# Patient Record
Sex: MALE | Race: WHITE | NOT HISPANIC OR LATINO | Employment: FULL TIME | URBAN - METROPOLITAN AREA
[De-identification: names, ages, dates, MRNs, and addresses within clinical notes are randomized per-mention and may not be internally consistent; named-entity substitution may affect disease eponyms.]

---
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
## 2023-03-21 ENCOUNTER — OFFICE VISIT (OUTPATIENT)
Dept: INTERNAL MEDICINE | Facility: CLINIC | Age: 23
End: 2023-03-21
Payer: COMMERCIAL

## 2023-03-21 VITALS
BODY MASS INDEX: 19.27 KG/M2 | TEMPERATURE: 98.7 F | HEART RATE: 107 BPM | DIASTOLIC BLOOD PRESSURE: 68 MMHG | SYSTOLIC BLOOD PRESSURE: 130 MMHG | HEIGHT: 73 IN | OXYGEN SATURATION: 98 % | WEIGHT: 145.4 LBS

## 2023-03-21 DIAGNOSIS — F41.8 SITUATIONAL ANXIETY: Chronic | ICD-10-CM

## 2023-03-21 DIAGNOSIS — K58.2 IRRITABLE BOWEL SYNDROME WITH BOTH CONSTIPATION AND DIARRHEA: Primary | Chronic | ICD-10-CM

## 2023-03-21 DIAGNOSIS — Z23 NEED FOR VACCINATION: ICD-10-CM

## 2023-03-21 DIAGNOSIS — R55 VASOVAGAL SYNCOPE: ICD-10-CM

## 2023-03-21 DIAGNOSIS — R00.0 TACHYCARDIA: Chronic | ICD-10-CM

## 2023-03-21 PROCEDURE — 99204 OFFICE O/P NEW MOD 45 MIN: CPT | Performed by: INTERNAL MEDICINE

## 2023-03-21 RX ORDER — UREA 10 %
LOTION (ML) TOPICAL DAILY PRN
COMMUNITY

## 2023-03-21 RX ORDER — FLUTICASONE PROPIONATE 50 MCG
SPRAY, SUSPENSION (ML) NASAL
COMMUNITY
End: 2023-03-21

## 2023-03-21 NOTE — ASSESSMENT & PLAN NOTE
This may be due to being in a new doctor's office. I have asked the patient to check his heart rate at home and note them down and then send us a message in about 3 weeks to let us know what it runs when he is relaxing and when he is doing his normal activities.

## 2023-03-21 NOTE — ASSESSMENT & PLAN NOTE
He will start using Benefiber one scoop in hot coffee or tea every morning to see if that evens out the stools a little bit better. He will continue to avoid excessive sugars and fats in the diet.

## 2023-03-21 NOTE — PROGRESS NOTES
Adrián Sharif  2000  [id number]  Patient Care Team:  Letha Navas MD as PCP - General (Internal Medicine)    Adrián Sharif is a 22 y.o. here today to establish care.    Previously under the care of pediatrician.    Chief Complaint   Patient presents with   • Establish Care       HPI:     The patient presents today for a new patient visit.    Hypertension  The patient's blood pressure is slightly elevated today at 130/68 mmHg. He has not checked his blood pressure anywhere else lately. His parents have a blood pressure cuff at home; however , they do not live close by. The patient saw a cardiologist in 2021 and his blood pressure was perfect that day at 120/60 mmHg. He had a syncope episode and the cardiologist thought it was vasovagal syncope. They did a monitor and there were no problems, just occasional early beats. The patient has an intermittent secondary heart block type 1, but it is benign. He was told to increase his hydration. For physical exercise he skateboards when the weather is nice. He chooses to take the stairs when possible. He consumes a moderately low fat diet. He consumes vegetables.     Vasovagal syncope  The patient states that when he passed out, he had regular abdominal pains. During an episode of abdominal pain he was on the toilet and he had a syncope episode. The patient thinks he was having diarrhea. He has not passed out since then. The patient has never passed out before.    Abdominal pain  The patient still has occasional abdominal pains, approximately once a month. He has tried changing his diet before without improvement. The patient had a colonoscopy with normal findings. He was not given any other diagnosis other than IBS. When he has the pain, it is located in the umbilical region. The patient tends to go through periods where he has loose stools and then periods with constipation. The patient does not feel like he needs to take anything for the loose  "stools. It does not happen too often. It does not prevent him from going places or doing what he wants to do. Occasionally when he is out, he has to make more stops than the average person. The patient has gone home from places before because of it. It does not affect his day-to-day life. The patient does not take anything for constipation. He has tried laxatives when he was younger with no resolve. The patient does not take anything for it now. He takes a couple of Tums when get abdominal pain. He feels as though his anxiety could be causing the loose stools. When he was younger, he tried eliminating sodas and still does not have sodas often. The patient thought it was the preservative in lot of cereal and cheeses. He eliminated it without resolve. He increased his water intake and started eating better, less red meat, and more chicken and fish.    Anxiety  The patient reports that majority of his days are good days; however, when he has a bad day its \"really bad\".  The patient does not feel like anxiety interferes with his performance while he is at work or relationships. When he feels overwhelmed He feels as though he can not get anything done in his personal life. This also causes him to feel down and depressed as he feels like he can not do anything. After he takes care of something or if he can ever get up and moving, he feels a little better most of the time.    Tachycardia  The patient has not noticed any tachycardia. His pulse today is 107 bpm.     Family history  The patient's paternal grandmother had breast cancer. His maternal grandmother had skin cancer. His paternal aunt had breast cancer. His father has high blood pressure, high cholesterol, and history of a heart attack at age 42. His maternal great grandfather had a stroke. His paternal grandfather  from a heart attack in his 50's.    Immunizations  The patient is due for the Gardasil vaccine. He is due for the Tdap vaccine.          Past " "Medical History:   Diagnosis Date   • History of syncope    • IBS (irritable bowel syndrome)    • Second degree heart block      History reviewed. No pertinent surgical history.  Family History   Problem Relation Age of Onset   • No Known Problems Mother    • Heart attack Father    • Heart disease Father    • Hyperlipidemia Father    • Hypertension Father    • Diabetes Maternal Grandmother    • Osteoporosis Maternal Grandmother    • Cancer Maternal Grandmother    • Arthritis Maternal Grandfather    • Cancer Paternal Grandmother      Social History     Tobacco Use   Smoking Status Never   Smokeless Tobacco Never     No Known Allergies    Current Outpatient Medications:   •  calcium carbonate (OS-RICKEY) 1250 (500 Ca) MG chewable tablet, Chew Daily As Needed., Disp: , Rfl:   •  tretinoin (RETIN-A) 0.1 % cream, Apply 1 application topically to the appropriate area as directed 1 (One) Time Per Week., Disp: , Rfl:     Review of Systems    /68 (BP Location: Left arm, Patient Position: Sitting, Cuff Size: Adult)   Pulse 107   Temp 98.7 °F (37.1 °C) (Infrared)   Ht 186.2 cm (73.31\")   Wt 66 kg (145 lb 6.4 oz)   SpO2 98%   BMI 19.02 kg/m²     Physical Exam  Constitutional:       General: He is not in acute distress.     Appearance: He is well-developed.   HENT:      Head: Normocephalic and atraumatic.      Right Ear: External ear normal.      Left Ear: External ear normal.   Eyes:      Conjunctiva/sclera: Conjunctivae normal.      Pupils: Pupils are equal, round, and reactive to light.   Cardiovascular:      Rate and Rhythm: Regular rhythm. Tachycardia present.      Heart sounds: No murmur heard.  Pulmonary:      Effort: Pulmonary effort is normal. No respiratory distress.      Breath sounds: Normal breath sounds. No wheezing.   Abdominal:      General: Bowel sounds are normal. There is no distension.      Palpations: Abdomen is soft.      Tenderness: There is no abdominal tenderness.   Musculoskeletal:         " General: Normal range of motion.      Cervical back: Normal range of motion and neck supple.   Lymphadenopathy:      Cervical: No cervical adenopathy.   Skin:     General: Skin is warm and dry.   Neurological:      Mental Status: He is alert and oriented to person, place, and time.      Cranial Nerves: No cranial nerve deficit.         Results Review:  None         Assessment/Plan:  Patient Instructions   Problem List Items Addressed This Visit        Cardiac and Vasculature    Tachycardia (Chronic)    Current Assessment & Plan     This may be due to being in a new doctor's office. I have asked the patient to check his heart rate at home and note them down and then send us a message in about 3 weeks to let us know what it runs when he is relaxing and when he is doing his normal activities.         Vasovagal syncope    Current Assessment & Plan     He had one actual episode in 2021. He has not had any episodes since. We discussed staying well hydrated at all times. In hot weather, adding Gatorade or Body Armor or other electrolyte drink is a good idea.            Gastrointestinal Abdominal     Irritable bowel syndrome with both constipation and diarrhea - Primary (Chronic)    Current Assessment & Plan     He will start using Benefiber one scoop in hot coffee or tea every morning to see if that evens out the stools a little bit better. He will continue to avoid excessive sugars and fats in the diet.            Infectious Diseases    Need for vaccination    Overview     His last tetanus was November 2013.  I have advised him to get Tdap (tetanus diphtheria whooping cough) anytime between now and November.  He has not had the HPV vaccine.  He may come back by our office anytime to get it here or he may get it at his pharmacy.            Mental Health    Situational anxiety (Chronic)    Current Assessment & Plan     He seems to be handling things well. He seems to have good coping mechanisms. He will let us know if he is  having trouble with excessive anxiety or if he feels it is starting to interfere with his work or personal life. Avoiding excessive caffeine and chocolate in the diet does help people to calm down when they are already anxious.               Diagnosis Plan   1. Irritable bowel syndrome with both constipation and diarrhea        2. Situational anxiety        3. Vasovagal syncope        4. Tachycardia        5. Need for vaccination            Patient Instructions     Patient Instructions   Problem List Items Addressed This Visit          Cardiac and Vasculature    Tachycardia (Chronic)    Current Assessment & Plan     This may be due to being in a new doctor's office. I have asked the patient to check his heart rate at home and note them down and then send us a message in about 3 weeks to let us know what it runs when he is relaxing and when he is doing his normal activities.         Vasovagal syncope    Current Assessment & Plan     He had one actual episode in 2021. He has not had any episodes since. We discussed staying well hydrated at all times. In hot weather, adding Gatorade or Body Armor or other electrolyte drink is a good idea.            Gastrointestinal Abdominal     Irritable bowel syndrome with both constipation and diarrhea - Primary (Chronic)    Current Assessment & Plan     He will start using Benefiber one scoop in hot coffee or tea every morning to see if that evens out the stools a little bit better. He will continue to avoid excessive sugars and fats in the diet.            Infectious Diseases    Need for vaccination    Overview     His last tetanus was November 2013.  I have advised him to get Tdap (tetanus diphtheria whooping cough) anytime between now and November.  He has not had the HPV vaccine.  He may come back by our office anytime to get it here or he may get it at his pharmacy.            Mental Health    Situational anxiety (Chronic)    Current Assessment & Plan     He seems to be  handling things well. He seems to have good coping mechanisms. He will let us know if he is having trouble with excessive anxiety or if he feels it is starting to interfere with his work or personal life. Avoiding excessive caffeine and chocolate in the diet does help people to calm down when they are already anxious.                  I spent 56 minutes face to face with the patient, obtaining history, discussing symptoms and diagnoses and plan of treatment, discussing preventative measures, doing physical exam, reviewing records in epic, and documenting in the chart.      Plan of care reviewed with patient at the conclusion of today's visit. Education was provided regarding diagnosis and management.  Patient verbalizes understanding of and agreement with management plan.    Return in about 6 months (around 9/21/2023) for Annual physical.    Dictated Utilizing Dragon Dictation      Letha Navas MD     Transcribed from ambient dictation for Letha Navas MD by Bianca Mcguire.  03/21/23   19:35 EDT    Patient or patient representative verbalized consent to the visit recording.  I have personally performed the services described in this document as transcribed by the above individual, and it is both accurate and complete.  Letha Navas MD  3/23/2023  13:33 EDT

## 2023-03-21 NOTE — ASSESSMENT & PLAN NOTE
He seems to be handling things well. He seems to have good coping mechanisms. He will let us know if he is having trouble with excessive anxiety or if he feels it is starting to interfere with his work or personal life. Avoiding excessive caffeine and chocolate in the diet does help people to calm down when they are already anxious.

## 2023-03-21 NOTE — ASSESSMENT & PLAN NOTE
He had one actual episode in 2021. He has not had any episodes since. We discussed staying well hydrated at all times. In hot weather, adding Gatorade or Body Armor or other electrolyte drink is a good idea.

## 2023-03-23 NOTE — PATIENT INSTRUCTIONS
Patient Instructions   Problem List Items Addressed This Visit          Cardiac and Vasculature    Tachycardia (Chronic)    Current Assessment & Plan     This may be due to being in a new doctor's office. I have asked the patient to check his heart rate at home and note them down and then send us a message in about 3 weeks to let us know what it runs when he is relaxing and when he is doing his normal activities.         Vasovagal syncope    Current Assessment & Plan     He had one actual episode in 2021. He has not had any episodes since. We discussed staying well hydrated at all times. In hot weather, adding Gatorade or Body Armor or other electrolyte drink is a good idea.            Gastrointestinal Abdominal     Irritable bowel syndrome with both constipation and diarrhea - Primary (Chronic)    Current Assessment & Plan     He will start using Benefiber one scoop in hot coffee or tea every morning to see if that evens out the stools a little bit better. He will continue to avoid excessive sugars and fats in the diet.            Infectious Diseases    Need for vaccination    Overview     His last tetanus was November 2013.  I have advised him to get Tdap (tetanus diphtheria whooping cough) anytime between now and November.  He has not had the HPV vaccine.  He may come back by our office anytime to get it here or he may get it at his pharmacy.            Mental Health    Situational anxiety (Chronic)    Current Assessment & Plan     He seems to be handling things well. He seems to have good coping mechanisms. He will let us know if he is having trouble with excessive anxiety or if he feels it is starting to interfere with his work or personal life. Avoiding excessive caffeine and chocolate in the diet does help people to calm down when they are already anxious.

## 2023-03-29 ENCOUNTER — PATIENT ROUNDING (BHMG ONLY) (OUTPATIENT)
Dept: INTERNAL MEDICINE | Facility: CLINIC | Age: 23
End: 2023-03-29
Payer: COMMERCIAL

## 2023-09-25 ENCOUNTER — OFFICE VISIT (OUTPATIENT)
Dept: INTERNAL MEDICINE | Facility: CLINIC | Age: 23
End: 2023-09-25

## 2023-09-25 VITALS
TEMPERATURE: 98 F | HEART RATE: 84 BPM | DIASTOLIC BLOOD PRESSURE: 66 MMHG | WEIGHT: 148.2 LBS | HEIGHT: 73 IN | SYSTOLIC BLOOD PRESSURE: 116 MMHG | OXYGEN SATURATION: 98 % | BODY MASS INDEX: 19.64 KG/M2

## 2023-09-25 DIAGNOSIS — L70.9 ADULT ACNE: Chronic | ICD-10-CM

## 2023-09-25 DIAGNOSIS — Z00.00 ANNUAL PHYSICAL EXAM: Primary | ICD-10-CM

## 2023-09-25 DIAGNOSIS — Z23 NEED FOR VACCINATION: ICD-10-CM

## 2023-09-25 DIAGNOSIS — E55.9 VITAMIN D DEFICIENCY: ICD-10-CM

## 2023-09-25 DIAGNOSIS — Z13.220 LIPID SCREENING: ICD-10-CM

## 2023-09-25 DIAGNOSIS — K58.2 IRRITABLE BOWEL SYNDROME WITH BOTH CONSTIPATION AND DIARRHEA: Chronic | ICD-10-CM

## 2023-09-25 DIAGNOSIS — F41.8 SITUATIONAL ANXIETY: Chronic | ICD-10-CM

## 2023-09-25 PROCEDURE — 90686 IIV4 VACC NO PRSV 0.5 ML IM: CPT | Performed by: INTERNAL MEDICINE

## 2023-09-25 PROCEDURE — 90472 IMMUNIZATION ADMIN EACH ADD: CPT | Performed by: INTERNAL MEDICINE

## 2023-09-25 PROCEDURE — 90471 IMMUNIZATION ADMIN: CPT | Performed by: INTERNAL MEDICINE

## 2023-09-25 PROCEDURE — 90715 TDAP VACCINE 7 YRS/> IM: CPT | Performed by: INTERNAL MEDICINE

## 2023-09-25 PROCEDURE — 99395 PREV VISIT EST AGE 18-39: CPT | Performed by: INTERNAL MEDICINE

## 2023-09-25 RX ORDER — TRETINOIN 1 MG/G
1 CREAM TOPICAL WEEKLY
Qty: 45 G | Refills: 1 | Status: SHIPPED | OUTPATIENT
Start: 2023-09-25

## 2023-09-25 NOTE — PATIENT INSTRUCTIONS
Patient Instructions  Problem List Items Addressed This Visit          Gastrointestinal Abdominal     Irritable bowel syndrome with both constipation and diarrhea (Chronic)    Current Assessment & Plan     Continue eating a healthy low-fat diet with plenty of fiber. He could also add a fiber supplement such as Metamucil, Benefiber, or Citrucel daily as needed.            Health Encounters    Annual physical exam - Primary    Current Assessment & Plan     We will do screening labs. He is given the influenza vaccine and Tdap today. He will come back to our office in approximately 1 month to start the HPV vaccine series.         Relevant Orders    CBC & Differential    Comprehensive Metabolic Panel    TSH    Urinalysis With Microscopic - Urine, Clean Catch       Infectious Diseases    Need for vaccination    Overview     His last tetanus was November 2013.  I have advised him to get Tdap (tetanus diphtheria whooping cough) anytime between now and November.  He has not had the HPV vaccine.  He may come back by our office anytime to get it here or he may get it at his pharmacy.         Relevant Orders    Tdap Vaccine Greater Than or Equal To 8yo IM (Completed)       Mental Health    Situational anxiety (Chronic)    Current Assessment & Plan     Exercise and physical activity help decrease symptoms of anxiety and stress.            Skin    Adult acne (Chronic)    Current Assessment & Plan     He will continue using Retin-A in the evenings. Continue cleansing the face twice per day.         Relevant Medications    tretinoin (RETIN-A) 0.1 % cream     Other Visit Diagnoses       Lipid screening        Relevant Orders    Lipid Panel    Vitamin D deficiency        Relevant Orders    Vitamin D,25-Hydroxy            Heart-Healthy Eating Plan  Many factors influence your heart (coronary) health, including eating and exercise habits. Coronary risk increases with abnormal blood fat (lipid) levels. Heart-healthy meal planning  includes limiting unhealthy fats, increasing healthy fats, and making other diet and lifestyle changes.  What is my plan?  Your health care provider may recommend that you:  Limit your fat intake to _________% or less of your total calories each day.  Limit your saturated fat intake to _________% or less of your total calories each day.  Limit the amount of cholesterol in your diet to less than _________ mg per day.  What are tips for following this plan?  Cooking  Cook foods using methods other than frying. Baking, boiling, grilling, and broiling are all good options. Other ways to reduce fat include:  Removing the skin from poultry.  Removing all visible fats from meats.  Steaming vegetables in water or broth.  Meal planning  A plate with examples of foods in a healthy diet.      At meals, imagine dividing your plate into fourths:  Fill one-half of your plate with vegetables and green salads.  Fill one-fourth of your plate with whole grains.  Fill one-fourth of your plate with lean protein foods.  Eat 4-5 servings of vegetables per day. One serving equals 1 cup raw or cooked vegetable, or 2 cups raw leafy greens.  Eat 4-5 servings of fruit per day. One serving equals 1 medium whole fruit, ¼ cup dried fruit, ½ cup fresh, frozen, or canned fruit, or ½ cup 100% fruit juice.  Eat more foods that contain soluble fiber. Examples include apples, broccoli, carrots, beans, peas, and barley. Aim to get 25-30 g of fiber per day.  Increase your consumption of legumes, nuts, and seeds to 4-5 servings per week. One serving of dried beans or legumes equals ½ cup cooked, 1 serving of nuts is ¼ cup, and 1 serving of seeds equals 1 tablespoon.  Fats  Choose healthy fats more often. Choose monounsaturated and polyunsaturated fats, such as olive and canola oils, flaxseeds, walnuts, almonds, and seeds.  Eat more omega-3 fats. Choose salmon, mackerel, sardines, tuna, flaxseed oil, and ground flaxseeds. Aim to eat fish at least 2 times  each week.  Check food labels carefully to identify foods with trans fats or high amounts of saturated fat.  Limit saturated fats. These are found in animal products, such as meats, butter, and cream. Plant sources of saturated fats include palm oil, palm kernel oil, and coconut oil.  Avoid foods with partially hydrogenated oils in them. These contain trans fats. Examples are stick margarine, some tub margarines, cookies, crackers, and other baked goods.  Avoid fried foods.  General information  Eat more home-cooked food and less restaurant, buffet, and fast food.  Limit or avoid alcohol.  Limit foods that are high in starch and sugar.  Lose weight if you are overweight. Losing just 5-10% of your body weight can help your overall health and prevent diseases such as diabetes and heart disease.  Monitor your salt (sodium) intake, especially if you have high blood pressure. Talk with your health care provider about your sodium intake.  Try to incorporate more vegetarian meals weekly.  What foods can I eat?  Fruits  All fresh, canned (in natural juice), or frozen fruits.  Vegetables  Fresh or frozen vegetables (raw, steamed, roasted, or grilled). Green salads.  Grains  Most grains. Choose whole wheat and whole grains most of the time. Rice and pasta, including brown rice and pastas made with whole wheat.  Meats and other proteins  Lean, well-trimmed beef, veal, pork, and lamb. Chicken and turkey without skin. All fish and shellfish. Wild duck, rabbit, pheasant, and venison. Egg whites or low-cholesterol egg substitutes. Dried beans, peas, lentils, and tofu. Seeds and most nuts.  Dairy  Low-fat or nonfat cheeses, including ricotta and mozzarella. Skim or 1% milk (liquid, powdered, or evaporated). Buttermilk made with low-fat milk. Nonfat or low-fat yogurt.  Fats and oils  Non-hydrogenated (trans-free) margarines. Vegetable oils, including soybean, sesame, sunflower, olive, peanut, safflower, corn, canola, and cottonseed.  Salad dressings or mayonnaise made with a vegetable oil.  Beverages  Water (mineral or sparkling). Coffee and tea. Diet carbonated beverages.  Sweets and desserts  Sherbet, gelatin, and fruit ice. Small amounts of dark chocolate.  Limit all sweets and desserts.  Seasonings and condiments  All seasonings and condiments.  The items listed above may not be a complete list of foods and beverages you can eat. Contact a dietitian for more options.  What foods are not recommended?  Fruits  Canned fruit in heavy syrup. Fruit in cream or butter sauce. Fried fruit. Limit coconut.  Vegetables  Vegetables cooked in cheese, cream, or butter sauce. Fried vegetables.  Grains  Breads made with saturated or trans fats, oils, or whole milk. Croissants. Sweet rolls. Donuts. High-fat crackers, such as cheese crackers.  Meats and other proteins  Fatty meats, such as hot dogs, ribs, sausage, villegas, rib-eye roast or steak. High-fat deli meats, such as salami and bologna. Caviar. Domestic duck and goose. Organ meats, such as liver.  Dairy  Cream, sour cream, cream cheese, and creamed cottage cheese. Whole-milk cheeses. Whole or 2% milk (liquid, evaporated, or condensed). Whole buttermilk. Cream sauce or high-fat cheese sauce. Whole-milk yogurt.  Fats and oils  Meat fat, or shortening. Cocoa butter, hydrogenated oils, palm oil, coconut oil, palm kernel oil. Solid fats and shortenings, including villegas fat, salt pork, lard, and butter. Nondairy cream substitutes. Salad dressings with cheese or sour cream.  Beverages  Regular sodas and any drinks with added sugar.  Sweets and desserts  Frosting. Pudding. Cookies. Cakes. Pies. Milk chocolate or white chocolate. Buttered syrups. Full-fat ice cream or ice cream drinks.  The items listed above may not be a complete list of foods and beverages to avoid. Contact a dietitian for more information.  Summary  Heart-healthy meal planning includes limiting unhealthy fats, increasing healthy fats, and  making other diet and lifestyle changes.  Lose weight if you are overweight. Losing just 5-10% of your body weight can help your overall health and prevent diseases such as diabetes and heart disease.  Focus on eating a balance of foods, including fruits and vegetables, low-fat or nonfat dairy, lean protein, nuts and legumes, whole grains, and heart-healthy oils and fats.  This information is not intended to replace advice given to you by your health care provider. Make sure you discuss any questions you have with your health care provider.  Document Revised: 04/28/2022 Document Reviewed: 04/28/2022  [employer] Patient Education © 2022 [employer]

## 2023-09-25 NOTE — LETTER
[facility]  Vaccine Consent Form    Patient Name:  Adrián Sharif  Patient :  2000     Vaccine(s) Ordered    Tdap Vaccine Greater Than or Equal To 8yo IM  Fluzone (or Fluarix & Flulaval for VFC) >6mos        Screening Checklist  The following questions should be completed prior to vaccination. If you answer “yes” to any question, it does not necessarily mean you should not be vaccinated. It just means we may need to clarify or ask more questions. If a question is unclear, please ask your healthcare provider to explain it.    Yes No   Any fever or moderate to severe illness today (mild illness and/or antibiotic treatment are not contraindications)?     Do you have a history of a serious reaction to any previous vaccinations, such as anaphylaxis, encephalopathy within 7 days, Guillain-Mendota syndrome within 6 weeks, seizure?     Have you received any live vaccine(s) in the past month (MMR, ERIC)?     Do you have an anaphylactic allergy to latex (DTaP, DTaP-IPV, Hep A, Hep B, MenB, RV, Td, Tdap), baker’s yeast (Hep B, HPV), or gelatin (ERIC, MMR)?     Do you have an anaphylactic allergy to neomycin (Rabies, ERIC, MMR, IPV, Hep A), polymyxin B (IPV), or streptomycin (IPV)?      Any cancer, leukemia, AIDS, or other immune system disorder? (ERIC, MMR, RV)     Do you have a parent, brother, or sister with an immune system problem (if immune competence of vaccine recipient clinically verified, can proceed)? (MMR, ERIC)     Any recent steroid treatments for >2 weeks, chemotherapy, or radiation treatment? (ERIC, MMR)     Have you received antibody-containing blood transfusions or IVIG in the past 11 months (recommended interval is dependent on product)? (MMR, ERIC)     Have you taken antiviral drugs (acyclovir, famciclovir, valacyclovir) in the last 24 or 48 hours, respectively (ERIC)?      Are you pregnant or planning to become pregnant within 1 month? (ERIC, MMR, HPV, IPV, MenB; For hep B- refer to Engerix-B)      For infants, have you ever been told your child has had intussusception or a medical emergency involving obstruction of the intestine (RV)? If not for an infant, can skip this question.         *Ordering Physician/APC should be consulted if “yes” is checked by the patient or guardian above.      I have received, read, and understand the Vaccine Information Statement (VIS) for each vaccine ordered above.  I have considered my health status as well as the health status of my close contacts.  I have taken the opportunity to discuss my vaccine questions with my health care provider.   I have requested that the ordered vaccine(s) be given to me.  I understand the benefits and risks of the vaccines.  I understand that I should remain in the clinic for 15 minutes after receiving the vaccine(s).  _________________________________________________________  Signature of Patient or Parent/Legal Guardian ____________________  Date

## 2023-09-25 NOTE — ASSESSMENT & PLAN NOTE
We will do screening labs. He is given the influenza vaccine and Tdap today. He will come back to our office in approximately 1 month to start the HPV vaccine series.

## 2023-09-25 NOTE — ASSESSMENT & PLAN NOTE
Continue eating a healthy low-fat diet with plenty of fiber. He could also add a fiber supplement such as Metamucil, Benefiber, or Citrucel daily as needed.